# Patient Record
Sex: FEMALE | Race: WHITE | NOT HISPANIC OR LATINO | Employment: UNEMPLOYED | URBAN - METROPOLITAN AREA
[De-identification: names, ages, dates, MRNs, and addresses within clinical notes are randomized per-mention and may not be internally consistent; named-entity substitution may affect disease eponyms.]

---

## 2017-02-01 ENCOUNTER — APPOINTMENT (OUTPATIENT)
Dept: RADIOLOGY | Facility: MEDICAL CENTER | Age: 42
End: 2017-02-01
Attending: EMERGENCY MEDICINE

## 2017-02-01 ENCOUNTER — HOSPITAL ENCOUNTER (EMERGENCY)
Facility: MEDICAL CENTER | Age: 42
End: 2017-02-01
Attending: EMERGENCY MEDICINE

## 2017-02-01 VITALS
OXYGEN SATURATION: 97 % | WEIGHT: 225 LBS | RESPIRATION RATE: 19 BRPM | SYSTOLIC BLOOD PRESSURE: 120 MMHG | BODY MASS INDEX: 36.16 KG/M2 | TEMPERATURE: 98 F | HEIGHT: 66 IN | DIASTOLIC BLOOD PRESSURE: 85 MMHG | HEART RATE: 86 BPM

## 2017-02-01 DIAGNOSIS — R56.9 SEIZURE (HCC): ICD-10-CM

## 2017-02-01 LAB
ALBUMIN SERPL BCP-MCNC: 4.4 G/DL (ref 3.2–4.9)
ALBUMIN/GLOB SERPL: 1.3 G/DL
ALP SERPL-CCNC: 73 U/L (ref 30–99)
ALT SERPL-CCNC: 47 U/L (ref 2–50)
ANION GAP SERPL CALC-SCNC: 9 MMOL/L (ref 0–11.9)
ANISOCYTOSIS BLD QL SMEAR: ABNORMAL
AST SERPL-CCNC: 34 U/L (ref 12–45)
BASOPHILS # BLD AUTO: 1.8 % (ref 0–1.8)
BASOPHILS # BLD: 0.12 K/UL (ref 0–0.12)
BILIRUB SERPL-MCNC: 0.2 MG/DL (ref 0.1–1.5)
BUN SERPL-MCNC: 7 MG/DL (ref 8–22)
CALCIUM SERPL-MCNC: 9.6 MG/DL (ref 8.5–10.5)
CHLORIDE SERPL-SCNC: 108 MMOL/L (ref 96–112)
CO2 SERPL-SCNC: 22 MMOL/L (ref 20–33)
CREAT SERPL-MCNC: 0.65 MG/DL (ref 0.5–1.4)
EKG IMPRESSION: NORMAL
EOSINOPHIL # BLD AUTO: 0.18 K/UL (ref 0–0.51)
EOSINOPHIL NFR BLD: 2.7 % (ref 0–6.9)
ERYTHROCYTE [DISTWIDTH] IN BLOOD BY AUTOMATED COUNT: 45.4 FL (ref 35.9–50)
GFR SERPL CREATININE-BSD FRML MDRD: >60 ML/MIN/1.73 M 2
GLOBULIN SER CALC-MCNC: 3.3 G/DL (ref 1.9–3.5)
GLUCOSE SERPL-MCNC: 107 MG/DL (ref 65–99)
HCG SERPL QL: NEGATIVE
HCT VFR BLD AUTO: 32.1 % (ref 37–47)
HGB BLD-MCNC: 9 G/DL (ref 12–16)
HYPOCHROMIA BLD QL SMEAR: ABNORMAL
LYMPHOCYTES # BLD AUTO: 1.56 K/UL (ref 1–4.8)
LYMPHOCYTES NFR BLD: 23.7 % (ref 22–41)
MANUAL DIFF BLD: NORMAL
MCH RBC QN AUTO: 17.4 PG (ref 27–33)
MCHC RBC AUTO-ENTMCNC: 28 G/DL (ref 33.6–35)
MCV RBC AUTO: 62.2 FL (ref 81.4–97.8)
MICROCYTES BLD QL SMEAR: ABNORMAL
MONOCYTES # BLD AUTO: 0.06 K/UL (ref 0–0.85)
MONOCYTES NFR BLD AUTO: 0.9 % (ref 0–13.4)
MORPHOLOGY BLD-IMP: NORMAL
NEUTROPHILS # BLD AUTO: 4.68 K/UL (ref 2–7.15)
NEUTROPHILS NFR BLD: 70.9 % (ref 44–72)
NRBC # BLD AUTO: 0 K/UL
NRBC BLD AUTO-RTO: 0 /100 WBC
PHENYTOIN SERPL-MCNC: <0.5 UG/ML (ref 10–20)
PLATELET # BLD AUTO: 547 K/UL (ref 164–446)
PLATELET BLD QL SMEAR: NORMAL
PMV BLD AUTO: 9 FL (ref 9–12.9)
POIKILOCYTOSIS BLD QL SMEAR: NORMAL
POTASSIUM SERPL-SCNC: 3.7 MMOL/L (ref 3.6–5.5)
PROT SERPL-MCNC: 7.7 G/DL (ref 6–8.2)
RBC # BLD AUTO: 5.16 M/UL (ref 4.2–5.4)
RBC BLD AUTO: PRESENT
SCHISTOCYTES BLD QL SMEAR: NORMAL
SODIUM SERPL-SCNC: 139 MMOL/L (ref 135–145)
TARGETS BLD QL SMEAR: NORMAL
TROPONIN I SERPL-MCNC: <0.01 NG/ML (ref 0–0.04)
TSH SERPL DL<=0.005 MIU/L-ACNC: 1.78 UIU/ML (ref 0.3–3.7)
WBC # BLD AUTO: 6.6 K/UL (ref 4.8–10.8)

## 2017-02-01 PROCEDURE — 85027 COMPLETE CBC AUTOMATED: CPT

## 2017-02-01 PROCEDURE — 700105 HCHG RX REV CODE 258: Performed by: EMERGENCY MEDICINE

## 2017-02-01 PROCEDURE — 36415 COLL VENOUS BLD VENIPUNCTURE: CPT

## 2017-02-01 PROCEDURE — 96365 THER/PROPH/DIAG IV INF INIT: CPT

## 2017-02-01 PROCEDURE — 84703 CHORIONIC GONADOTROPIN ASSAY: CPT

## 2017-02-01 PROCEDURE — 84443 ASSAY THYROID STIM HORMONE: CPT

## 2017-02-01 PROCEDURE — 85007 BL SMEAR W/DIFF WBC COUNT: CPT

## 2017-02-01 PROCEDURE — 96361 HYDRATE IV INFUSION ADD-ON: CPT

## 2017-02-01 PROCEDURE — 99285 EMERGENCY DEPT VISIT HI MDM: CPT

## 2017-02-01 PROCEDURE — 700111 HCHG RX REV CODE 636 W/ 250 OVERRIDE (IP): Performed by: EMERGENCY MEDICINE

## 2017-02-01 PROCEDURE — 302128 INFUSION PUMP

## 2017-02-01 PROCEDURE — 94760 N-INVAS EAR/PLS OXIMETRY 1: CPT

## 2017-02-01 PROCEDURE — 96375 TX/PRO/DX INJ NEW DRUG ADDON: CPT

## 2017-02-01 PROCEDURE — 84484 ASSAY OF TROPONIN QUANT: CPT

## 2017-02-01 PROCEDURE — 71010 DX-CHEST-PORTABLE (1 VIEW): CPT

## 2017-02-01 PROCEDURE — 80185 ASSAY OF PHENYTOIN TOTAL: CPT

## 2017-02-01 PROCEDURE — 80053 COMPREHEN METABOLIC PANEL: CPT

## 2017-02-01 PROCEDURE — 93005 ELECTROCARDIOGRAM TRACING: CPT | Performed by: EMERGENCY MEDICINE

## 2017-02-01 RX ORDER — GABAPENTIN 600 MG/1
600 TABLET ORAL 3 TIMES DAILY
Qty: 90 TAB | Refills: 0 | Status: SHIPPED | OUTPATIENT
Start: 2017-02-01

## 2017-02-01 RX ORDER — GABAPENTIN 400 MG/1
600 CAPSULE ORAL 3 TIMES DAILY
COMMUNITY

## 2017-02-01 RX ORDER — SODIUM CHLORIDE 9 MG/ML
INJECTION, SOLUTION INTRAVENOUS CONTINUOUS
Status: DISCONTINUED | OUTPATIENT
Start: 2017-02-01 | End: 2017-02-01 | Stop reason: HOSPADM

## 2017-02-01 RX ORDER — LORAZEPAM 2 MG/ML
1 INJECTION INTRAMUSCULAR ONCE
Status: COMPLETED | OUTPATIENT
Start: 2017-02-01 | End: 2017-02-01

## 2017-02-01 RX ORDER — MORPHINE SULFATE 4 MG/ML
4 INJECTION, SOLUTION INTRAMUSCULAR; INTRAVENOUS ONCE
Status: COMPLETED | OUTPATIENT
Start: 2017-02-01 | End: 2017-02-01

## 2017-02-01 RX ORDER — ONDANSETRON 2 MG/ML
4 INJECTION INTRAMUSCULAR; INTRAVENOUS ONCE
Status: COMPLETED | OUTPATIENT
Start: 2017-02-01 | End: 2017-02-01

## 2017-02-01 RX ORDER — PHENYTOIN SODIUM 100 MG/1
100 CAPSULE, EXTENDED RELEASE ORAL 3 TIMES DAILY
Qty: 30 CAP | Refills: 0 | Status: SHIPPED | OUTPATIENT
Start: 2017-02-01

## 2017-02-01 RX ORDER — ALPRAZOLAM 1 MG/1
1 TABLET ORAL NIGHTLY PRN
COMMUNITY

## 2017-02-01 RX ORDER — SODIUM CHLORIDE 9 MG/ML
1000 INJECTION, SOLUTION INTRAVENOUS ONCE
Status: COMPLETED | OUTPATIENT
Start: 2017-02-01 | End: 2017-02-01

## 2017-02-01 RX ORDER — LORAZEPAM 2 MG/ML
1 INJECTION INTRAMUSCULAR ONCE
Status: DISCONTINUED | OUTPATIENT
Start: 2017-02-01 | End: 2017-02-01 | Stop reason: HOSPADM

## 2017-02-01 RX ORDER — PHENYTOIN SODIUM 100 MG/1
300 CAPSULE, EXTENDED RELEASE ORAL 3 TIMES DAILY
COMMUNITY

## 2017-02-01 RX ADMIN — SODIUM CHLORIDE: 9 INJECTION, SOLUTION INTRAVENOUS at 03:53

## 2017-02-01 RX ADMIN — LORAZEPAM 1 MG: 2 INJECTION INTRAMUSCULAR; INTRAVENOUS at 04:53

## 2017-02-01 RX ADMIN — SODIUM CHLORIDE 1000 ML: 9 INJECTION, SOLUTION INTRAVENOUS at 03:39

## 2017-02-01 RX ADMIN — MORPHINE SULFATE 4 MG: 4 INJECTION INTRAVENOUS at 03:51

## 2017-02-01 RX ADMIN — PHENYTOIN SODIUM 1000 MG: 50 INJECTION INTRAMUSCULAR; INTRAVENOUS at 04:42

## 2017-02-01 RX ADMIN — ONDANSETRON 4 MG: 2 INJECTION, SOLUTION INTRAMUSCULAR; INTRAVENOUS at 03:51

## 2017-02-01 ASSESSMENT — LIFESTYLE VARIABLES: DO YOU DRINK ALCOHOL: NO

## 2017-02-01 ASSESSMENT — PAIN SCALES - GENERAL
PAINLEVEL_OUTOF10: 5
PAINLEVEL_OUTOF10: 5

## 2017-02-01 NOTE — DISCHARGE PLANNING
Medical Social Work    Referral: Family Contact    Intervention: MSW spoke with bedside RN who states that pt cannot get a hold of her , Yann.  MSW located a possible relative, Saskia Schrader ([phone]).  MSW and HARSH [place] spoke with pt who states that Saskia is her mother-in-law.  Pt spoke with her mother-in-law via phone and obtained her 's phone number ([phone]).  Pt was able to contact her  via phone.  Pt's  is a  and they are from [place].  Pt's  is approximately one hour away and on his way to [place].  Bedside RN updated.    Plan: SW will continue to follow as needed.

## 2017-02-01 NOTE — ED NOTES
Patient ambulatory to [facility] with sba, tolerated well.  Urine sample sent to lab.

## 2017-02-01 NOTE — ED NOTES
"Received report from Dorothy BOWERS,  Pt told RN that she just had 3 grand mal seizure, told RN that i will give here ativan for it but pt states that \"i don't need ativan\", im having pain, my pain triggers seizure. i don't want any more ativan. erp notified.  "

## 2017-02-01 NOTE — ED NOTES
"Patient from [place] with  , bib flight crew from [place]:  Chief Complaint   Patient presents with   • Seizure     hx of grand mal seizures, witnessed 40 minute seizure   • Numbness     bilateral hands and feet     Seizure onset last night at approx 2320, stopped at 0000.  Patient has a hx of todds paralysis, reportedly left sided facial droop \"will come and go with seizures\".      Pt reports ran out of dilantin 3 days ago and took last dose of gabapentin today.    PTA [id number].    Pt changed into gown, chart up for ERP.  "

## 2017-02-01 NOTE — ED NOTES
Patient anxious and concerned, unable to contact  and he does not know where care flight brought patient,  assisted to contact patients , patient spoke with , informed him of situation and whereabouts.

## 2017-02-01 NOTE — ED NOTES
Blood sent to lab.  Pt unable to provide urine at this time, requested to wait until more fluids infused.

## 2017-02-01 NOTE — ED NOTES
"Patient reported to RN, \" I just had 3 seizures and nobody cares\"; RN notified ERP, new order received, see MAR.  "

## 2017-02-01 NOTE — DISCHARGE INSTRUCTIONS
Seizure, Adult  A seizure is abnormal electrical activity in the brain. Seizures usually last from 30 seconds to 2 minutes. There are various types of seizures.  Before a seizure, you may have a warning sensation (aura) that a seizure is about to occur. An aura may include the following symptoms:   · Fear or anxiety.  · Nausea.  · Feeling like the room is spinning (vertigo).  · Vision changes, such as seeing flashing lights or spots.  Common symptoms during a seizure include:  · A change in attention or behavior (altered mental status).  · Convulsions with rhythmic jerking movements.  · Drooling.  · Rapid eye movements.  · Grunting.  · Loss of bladder and bowel control.  · Bitter taste in the mouth.  · Tongue biting.  After a seizure, you may feel confused and sleepy. You may also have an injury resulting from convulsions during the seizure.  HOME CARE INSTRUCTIONS   · If you are given medicines, take them exactly as prescribed by your health care provider.  · Keep all follow-up appointments as directed by your health care provider.  · Do not swim or drive or engage in risky activity during which a seizure could cause further injury to you or others until your health care provider says it is OK.  · Get adequate rest.  · Teach friends and family what to do if you have a seizure. They should:  ¨ Lay you on the ground to prevent a fall.  ¨ Put a cushion under your head.  ¨ Loosen any tight clothing around your neck.  ¨ Turn you on your side. If vomiting occurs, this helps keep your airway clear.  ¨ Stay with you until you recover.  ¨ Know whether or not you need emergency care.  SEEK IMMEDIATE MEDICAL CARE IF:  · The seizure lasts longer than 5 minutes.  · The seizure is severe or you do not wake up immediately after the seizure.  · You have an altered mental status after the seizure.  · You are having more frequent or worsening seizures.  Someone should drive you to the emergency department or call local emergency  services (911 in [place]  MAKE SURE YOU:  · Understand these instructions.  · Will watch your condition.  · Will get help right away if you are not doing well or get worse.     This information is not intended to replace advice given to you by your health care provider. Make sure you discuss any questions you have with your health care provider.     Document Released: 12/15/2001 Document Revised: 01/08/2016 Document Reviewed: 07/30/2014  [employer] Interactive Patient Education ©2016 [employer]

## 2017-02-01 NOTE — ED NOTES
ERP notified of left sided facial paralysis (pt reports hx with seizures) and Bilateral hand/feet n/t, CT order placed.    Pt c/o pain to face, new orders received, see MAR.

## 2017-02-01 NOTE — ED NOTES
"Pt told RN and erp that she had another grand mal seizure but no witnessed episode of seizure noted w/curtain open.   Told erp that her left knee is dislocated, offered xray by erp but pt states \"i don't need xray done i know what it is\" but pt able to stand up take few steps independently w/steady gait. Pt also states that she doesn't want to wait to get her MRI, states that i just wanted to leave now and i just need my prescriptions. All other concerns addressed to the doctor by pt, interventions offered by ERP but pt keeps refusing.  Risks and benefits of staying vs leaving the hospital explained to pt by erp, but pt still opted to sign AMA.   AMA form signed by pt. Prescriptions x 2 given to pt. Pt wheeled to lobby by staff. Pt left w/o having any episode of seizure activity.  "

## 2017-02-01 NOTE — ED NOTES
Pt reports facial tingling reduced, left facial paralysis resolving.  Pt A&O, speaking in full sentences, NAD noted.

## 2017-02-01 NOTE — ED PROVIDER NOTES
ED Provider Note    CHIEF COMPLAINT  Chief Complaint   Patient presents with   • Seizure     hx of grand mal seizures, witnessed 40 minute seizure   • Numbness     bilateral hands and feet       HPI  Meena Phillips is a 41 y.o. female who presents after having a seizure. Patient has a history of seizure disorder. She is supposed to take Dilantin daily. She ran out of her medication and has not been taking it for the last 3 days. Last night she had a seizure area and this was described as generalized tonic-clonic. It's unclear exactly how long it lasted, but there may have been some activity for around 40 minutes. Following this event the left side of her face feels numb and tingly and she feels like her right side is spasming. She feels tingling in her left arm and left leg. She has had history of seizure disorder for many years now. She has also had multiple episodes of George's paralysis. She's never had a stroke. She does suffer from severe anxiety and is maintained on Xanax and Ativan daily. She reports she's been taking these medications. She denies having a headache, but has right-sided facial pain from the squeezing spasming. She states that she has some sharp chest pain more on the right side of her chest since the seizure. This is not atypical for her to have this. No shortness of breath, diaphoresis or nausea.    REVIEW OF SYSTEMS  As per HPI, otherwise a 10 point review of systems is negative    PAST MEDICAL HISTORY   anxiety, seizure disorder, George's paralysis, migraine headache, ovarian cysts, fibroid uterus, sickle cell trait    SOCIAL HISTORY   she lives in [place], but is currently traveling with her  who is driving for [employer]    SURGICAL HISTORY  Past Surgical History   Procedure Laterality Date   • Gyn surgery  2003       CURRENT MEDICATIONS  Home Medications     Reviewed by Dorothy Beck R.N. (Registered Nurse) on 02/01/17 at 0247  Med List Status: Complete    Medication  "Last Dose Status    alprazolam (XANAX) 1 MG Tab 1/31/2017 Active    gabapentin (NEURONTIN) 400 MG Cap 1/31/2017 Active    LORazepam (ATIVAN PO)  Active    phenytoin ER (DILANTIN) 100 MG Cap 1/29/2017 Active                ALLERGIES  Allergies   Allergen Reactions   • Toradol    • Tramadol        PHYSICAL EXAM  VITAL SIGNS: /85 mmHg  Pulse 80  Temp(Src) 36.7 °C (98 °F)  Resp 14  Ht 1.676 m (5' 5.98\")  Wt 102.059 kg (225 lb)  BMI 36.33 kg/m2  SpO2 95%  LMP 01/25/2017 (Approximate)   Constitutional: Awake and alert  HENT:  Atraumatic, Normocephalic.Oropharynx moist mucus membranes, Nose normal inspection.   Eyes: Normal inspection  Neck: Supple  Cardiovascular: Normal heart rate, Normal rhythm.  Symmetric peripheral pulses.   Thorax & Lungs: No respiratory distress, No wheezing, No rales, No rhonchi, No chest tenderness.   Abdomen: Bowel sounds normal, soft, non-distended, nontender, no mass  Skin: Warm, Dry, No rash.   Back: No tenderness, No CVA tenderness.   Extremities: No clubbing, cyanosis, edema, no Homans or cords . No focal bony tenderness. Full range of motion over the knees. No ligamentous instability. No tenderness over the shoulders or upper extremities.  Neurologic: Awake alert and oriented. He is a left-sided facial droop although she does have motor. Describes sensory disturbance over the left face. Clear speech. She has symmetric motor in her upper and lower extremities. Describes sensory disturbance over the left hand and left foot. She has a steady gait.  Psychiatric: Anxious appearing    RADIOLOGY/PROCEDURES  DX-CHEST-PORTABLE (1 VIEW)   Final Result      No acute cardiopulmonary abnormality identified.         Imaging is interpreted by radiologist    Labs:  Results for orders placed or performed during the hospital encounter of 02/01/17   CBC WITH DIFFERENTIAL   Result Value Ref Range    WBC 6.6 4.8 - 10.8 K/uL    RBC 5.16 4.20 - 5.40 M/uL    Hemoglobin 9.0 (L) 12.0 - 16.0 g/dL    " Hematocrit 32.1 (L) 37.0 - 47.0 %    MCV 62.2 (L) 81.4 - 97.8 fL    MCH 17.4 (L) 27.0 - 33.0 pg    MCHC 28.0 (L) 33.6 - 35.0 g/dL    RDW 45.4 35.9 - 50.0 fL    Platelet Count 547 (H) 164 - 446 K/uL    MPV 9.0 9.0 - 12.9 fL    Nucleated RBC 0.00 /100 WBC    NRBC (Absolute) 0.00 K/uL    Neutrophils-Polys 70.90 44.00 - 72.00 %    Lymphocytes 23.70 22.00 - 41.00 %    Monocytes 0.90 0.00 - 13.40 %    Eosinophils 2.70 0.00 - 6.90 %    Basophils 1.80 0.00 - 1.80 %    Neutrophils (Absolute) 4.68 2.00 - 7.15 K/uL    Lymphs (Absolute) 1.56 1.00 - 4.80 K/uL    Monos (Absolute) 0.06 0.00 - 0.85 K/uL    Eos (Absolute) 0.18 0.00 - 0.51 K/uL    Baso (Absolute) 0.12 0.00 - 0.12 K/uL    Hypochromia 1+     Anisocytosis 2+     Microcytosis 2+    COMP METABOLIC PANEL   Result Value Ref Range    Sodium 139 135 - 145 mmol/L    Potassium 3.7 3.6 - 5.5 mmol/L    Chloride 108 96 - 112 mmol/L    Co2 22 20 - 33 mmol/L    Anion Gap 9.0 0.0 - 11.9    Glucose 107 (H) 65 - 99 mg/dL    Bun 7 (L) 8 - 22 mg/dL    Creatinine 0.65 0.50 - 1.40 mg/dL    Calcium 9.6 8.5 - 10.5 mg/dL    AST(SGOT) 34 12 - 45 U/L    ALT(SGPT) 47 2 - 50 U/L    Alkaline Phosphatase 73 30 - 99 U/L    Total Bilirubin 0.2 0.1 - 1.5 mg/dL    Albumin 4.4 3.2 - 4.9 g/dL    Total Protein 7.7 6.0 - 8.2 g/dL    Globulin 3.3 1.9 - 3.5 g/dL    A-G Ratio 1.3 g/dL   DILANTIN   Result Value Ref Range    Phenytoin <0.5 (L) 10.0 - 20.0 ug/mL   ESTIMATED GFR   Result Value Ref Range    GFR If African American >60 >60 mL/min/1.73 m 2    GFR If Non African American >60 >60 mL/min/1.73 m 2   HCG QUAL SERUM   Result Value Ref Range    Beta-Hcg Qualitative Serum Negative Negative   TSH   Result Value Ref Range    TSH 1.780 0.300 - 3.700 uIU/mL   TROPONIN   Result Value Ref Range    Troponin I <0.01 0.00 - 0.04 ng/mL   DIFFERENTIAL MANUAL   Result Value Ref Range    Manual Diff Status PERFORMED    PERIPHERAL SMEAR REVIEW   Result Value Ref Range    Peripheral Smear Review see below    PLATELET  ESTIMATE   Result Value Ref Range    Plt Estimation Increased    MORPHOLOGY   Result Value Ref Range    RBC Morphology Present     Poikilocytosis 1+     Schistocytes 1+     Target Cells 2+    EKG (ER)   Result Value Ref Range    Report       [facility] Emergency Dept.    Test Date:  2017  Pt Name:    IVELISSE POWER            Department: ER  MRN:        [id number]                      Room:       [facility]  Gender:     F                            Technician: [id number]  :        [id number]                   Requested By:ADELE LARIOS  Order #:    [id number]                    Reading MD: ADELE LARIOS MD    Measurements  Intervals                                Axis  Rate:       83                           P:          51  IL:         144                          QRS:        41  QRSD:       78                           T:          -5  QT:         384  QTc:        452    Interpretive Statements  SINUS RHYTHM  No previous ECG available for comparison    Electronically Signed On 2017 6:20:49 PST by ADELE LARIOS MD         COURSE & MEDICAL DECISION MAKING  The patient presents with reported seizure. She has questionable neurologic findings on exam that weren't terribly consistent during the history. She was quite anxious. She had been noncompliant with her antiepileptic medications. She is given Ativan intravenously. She was hydrated with normal saline. She was given 1 g loading dose of Dilantin intravenously. She complained of pain over her right face because it was twisting and then had various other pain complaints. She was given morphine and Zofran intravenously. She was observed in the ER. Given her possible neurologic findings my plan was to obtain MRI of the brain. All of her neurologic symptoms resolved after medications administered as above. Patient was waiting for MRI when she reported that she had 3 more seizures. She was sitting right across from the nursing staff and no  additional activity was witnessed. She had no changes on the cardiac monitor. She requested pain medication again after this. She stated that she had seizures because of her pain and was having pain after her seizures. I ordered additional Ativan given that she reported she had more seizure but the patient refused this. She was not agreeable to taking any Tylenol or ibuprofen for discomfort. She complained of some chest pain. Her EKG was unremarkable. Troponin was negative. Suspect chest wall contusion or strain. She said she always had this kind of pain after her seizures as well. She was highly argumentative about her disorder. She complained of pain in her knee. I examined her knee and did not see any objective abnormalities, but offered an x-ray. She decided that she did not want to have this done. I am not sure why the patient became so agitated. She requested prescriptions for her antiepileptics and would not stay in the hospital for any further evaluation. She understood that my plan was to rule out stroke or serious pathology that could lead to death or permanent disability. Patient will leave the hospital against advice regardlesss. I've given prescriptions for Dilantin and gabapentin. She is to return to the ER at any time or for any concerning symptoms.    FINAL IMPRESSION  1. Seizure  2. 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 Noncompliance with medication regimen  4. Oppositional behavior      This dictation was created using voice recognition software. The accuracy of the dictation is limited to the abilities of the software.  The nursing notes were reviewed and certain aspects of this information were incorporated into this note.      Electronically signed by: Shawn Oropeza, 2/1/2017 6:16 AM